# Patient Record
Sex: MALE | Race: WHITE | NOT HISPANIC OR LATINO | URBAN - NONMETROPOLITAN AREA
[De-identification: names, ages, dates, MRNs, and addresses within clinical notes are randomized per-mention and may not be internally consistent; named-entity substitution may affect disease eponyms.]

---

## 2020-03-16 DIAGNOSIS — Z00.00 GENERAL MEDICAL EXAMINATION: Primary | ICD-10-CM

## 2020-03-17 ENCOUNTER — OFFICE VISIT (OUTPATIENT)
Dept: CARDIOLOGY | Facility: CLINIC | Age: 70
End: 2020-03-17

## 2020-03-17 VITALS
BODY MASS INDEX: 30.21 KG/M2 | HEIGHT: 71 IN | DIASTOLIC BLOOD PRESSURE: 82 MMHG | SYSTOLIC BLOOD PRESSURE: 135 MMHG | RESPIRATION RATE: 19 BRPM | WEIGHT: 215.8 LBS | HEART RATE: 69 BPM | OXYGEN SATURATION: 98 %

## 2020-03-17 DIAGNOSIS — I10 BENIGN ESSENTIAL HTN: ICD-10-CM

## 2020-03-17 DIAGNOSIS — Z00.00 GENERAL MEDICAL EXAMINATION: Primary | ICD-10-CM

## 2020-03-17 DIAGNOSIS — E78.2 MIXED HYPERLIPIDEMIA: Primary | ICD-10-CM

## 2020-03-17 PROCEDURE — 93000 ELECTROCARDIOGRAM COMPLETE: CPT | Performed by: INTERNAL MEDICINE

## 2020-03-17 PROCEDURE — 99203 OFFICE O/P NEW LOW 30 MIN: CPT | Performed by: INTERNAL MEDICINE

## 2020-03-17 RX ORDER — ROSUVASTATIN CALCIUM 10 MG/1
10 TABLET, COATED ORAL DAILY
Qty: 30 TABLET | Refills: 11 | Status: SHIPPED | OUTPATIENT
Start: 2020-03-17 | End: 2021-03-15

## 2020-03-17 RX ORDER — LOSARTAN POTASSIUM 50 MG/1
50 TABLET ORAL DAILY
COMMUNITY
Start: 2019-07-25 | End: 2020-09-18

## 2020-03-17 RX ORDER — CHLORAL HYDRATE 500 MG
1 CAPSULE ORAL DAILY
COMMUNITY

## 2020-03-17 RX ORDER — ASPIRIN 81 MG/1
81 TABLET ORAL DAILY
COMMUNITY

## 2020-03-17 RX ORDER — OMEPRAZOLE 40 MG/1
40 CAPSULE, DELAYED RELEASE ORAL DAILY PRN
COMMUNITY
Start: 2020-03-16

## 2020-03-17 RX ORDER — SIMVASTATIN 20 MG
20 TABLET ORAL DAILY
COMMUNITY
Start: 2019-07-25 | End: 2020-03-17

## 2020-03-17 NOTE — PROGRESS NOTES
[facility] Cardiology  OFFICE CONSULT NOTE    Patient Name: Bjorn Quiñonez  Age/Sex: 70 y.o. male  : 1950  MRN: [id number]      Referring Provider: Reyna      Chief Complaint: Cholesterol and hypertension    History of Present Illness:  Bjorn Quiñonez is a 70 y.o. male known to me from previous practice who is here to establish care.  Has a history of cholesterol and hypertension.  His blood pressures under good control.  He is having no chest pain or shortness of air.  He does have a low LDL cholesterol.  He is on simvastatin.  His only complaint is sometimes his feet and hands get cold.  Do not turn colors like when he is in cold weather or like putting his hand in a freezer.   Last Echo:    Last Cath:      Past Medical History:  History reviewed. No pertinent past medical history.    PAST SURGERY   Past Surgical History:   Procedure Laterality Date   • APPENDECTOMY     • LUNG SURGERY      collapsed lung   • ROTATOR CUFF REPAIR Left        Home Medications:      Current Outpatient Medications:   •  aspirin 81 MG EC tablet, Take 81 mg by mouth Daily., Disp: , Rfl:   •  Calcium Polycarbophil (FIBER-CAPS PO), Take 1 mL by mouth Daily., Disp: , Rfl:   •  Coenzyme Q10 (COQ10 PO), Take 1 tablet by mouth Daily., Disp: , Rfl:   •  losartan (COZAAR) 50 MG tablet, Take 50 mg by mouth Daily., Disp: , Rfl:   •  MULTIPLE VITAMINS-MINERALS ER PO, Take 1 tablet by mouth Daily., Disp: , Rfl:   •  Omega-3 Fatty Acids (FISH OIL) 1000 MG capsule capsule, Take 1 capsule by mouth Daily., Disp: , Rfl:   •  omeprazole (priLOSEC) 40 MG capsule, Take 40 mg by mouth Daily As Needed., Disp: , Rfl:   •  Probiotic Product (PROBIOTIC-10 PO), Take 1 tablet by mouth Daily., Disp: , Rfl:   •  rosuvastatin (CRESTOR) 10 MG tablet, Take 1 tablet by mouth Daily., Disp: 30 tablet, Rfl: 11    Allergies:  Allergies   Allergen Reactions   • Benazepril Cough     cough   • Clindamycin Rash   • Codeine Nausea And Vomiting        Social  History:  Social History     Socioeconomic History   • Marital status:      Spouse name: Not on file   • Number of children: Not on file   • Years of education: Not on file   • Highest education level: Not on file   Tobacco Use   • Smoking status: Never Smoker   • Smokeless tobacco: Never Used   Substance and Sexual Activity   • Alcohol use: Never     Frequency: Never   • Drug use: Never   • Sexual activity: Defer        Family History:  Family History   Problem Relation Age of Onset   • Heart attack Father    • Heart disease Sister    • Heart failure Sister    • Heart disease Brother          Review of Systems:   Constitution: No chills, no rigors, no unexplained weight loss or weight gain    Eyes:  No diplopia, no blurred vision, no loss of vision, conjunctiva is pink and sclera is anicteric    ENT:  No tinnitus, no otorrhea, no epistaxis, no sore throat   Respiratory: No cough, no hemoptysis    Cardiovascular:  As mentioned in HPI    Gastrointestinal: No nausea, no vomiting, no hematemesis, no diarrhea or constipation, no melena    Genitourinary: No frequency of dysuria no hematuria    Integument: positive for  No pruritis and  no skin rash    Hematologic / Lymphatic: No excessive bleeding, easy bruising, fatigue, lymphadenopathy and petechiae    Musculoskeletal: No joint pain, joint stiffness, joint swelling, muscle pain, muscle weakness and neck pain    Neurological: No dizziness, headaches, light headedness, seizures and vertigo or stroke    Behavioral/Psych: No depression, or bipolar disorder    Endocrine: no h/o diabetes, hyperlipidemia or thyroid problems        Vitals:    03/17/20 1032   BP: 135/82   Pulse:    Resp:    SpO2:        Body mass index is 30.1 kg/m².          Physical Exam:   General Appearance:    Alert, oriented, cooperative, in no acute distress     Head:    Normocephalic, atraumatic, without obvious abnormality     Eyes:            Lids and lashes normal, conjunctivae and sclerae  normal, no   icterus, no pallor     Ears:    Ears appear intact with no abnormalities noted     Throat:   Mucous membranes pink and moist     Neck:   Supple, trachea midline, no carotid bruit, no JVD     Lungs:     Air enty equal,  Clear to auscultation, respirations regular, Unlabored. No wheezes, rales, rhonchi    Heart:    Regular rhythm and normal rate, normal S1 and S2, no            murmur, no gallop,      Abdomen:     Normal bowel sounds, no masses, liver and spleen nonpalpable, soft, non-tender, non-distended, no guarding, no rebound tenderness       Extremities:   Moves all extremities well, no edema, no cyanosis, no              Redness, no clubbing     Pulses:   Pulses palpable and equal bilaterally       Neurologic:   Alert and oriented to person, place, and time. No focal neurological deficits       Lab Review:     Patient brought his labs with him and they are sore on care everywhere.  His HDL is 31 total cholesterol is 118 LDL is 50.  All his other labs look good.   ]    Assessment/Plan     1. Mixed hyperlipidemia  His LDL is still low.  I am going to try to switch him to Crestor 10 mg daily see if we can have this increase.  We will probably plan to have a  Another lipid panel checked in about 12 to 16 weeks.  2. Benign essential HTN  This is under good control.  3.Cold hands and feet  His pulses are good.  I do not think there is much issue with this clinically.  If he does notice changes with colors then we can always readdress this.      Return in about 6 months (around 9/17/2020).

## 2020-09-18 ENCOUNTER — OFFICE VISIT (OUTPATIENT)
Dept: CARDIOLOGY | Facility: CLINIC | Age: 70
End: 2020-09-18

## 2020-09-18 VITALS
DIASTOLIC BLOOD PRESSURE: 78 MMHG | HEIGHT: 71 IN | BODY MASS INDEX: 29.51 KG/M2 | HEART RATE: 75 BPM | WEIGHT: 210.8 LBS | OXYGEN SATURATION: 99 % | SYSTOLIC BLOOD PRESSURE: 118 MMHG

## 2020-09-18 DIAGNOSIS — E78.2 MIXED HYPERLIPIDEMIA: ICD-10-CM

## 2020-09-18 DIAGNOSIS — I10 BENIGN ESSENTIAL HTN: Primary | ICD-10-CM

## 2020-09-18 PROCEDURE — 99214 OFFICE O/P EST MOD 30 MIN: CPT | Performed by: INTERNAL MEDICINE

## 2020-09-18 RX ORDER — LOSARTAN POTASSIUM 100 MG/1
1 TABLET ORAL DAILY
COMMUNITY
Start: 2020-07-10 | End: 2020-10-26

## 2020-09-18 RX ORDER — HYDROCHLOROTHIAZIDE 25 MG/1
0.5 TABLET ORAL DAILY
COMMUNITY
Start: 2020-07-30

## 2020-09-18 NOTE — PROGRESS NOTES
Bjorn Quiñonez  70 y.o. male    09/18/2020  Chief Complaint   Patient presents with   • Follow-up       History of Present Illness  Ayush is here to follow-up with hypertension    -        SUBJECTIVE  Ayush is overall happy his blood pressure under good control.  He did get put on hydrochlorothiazide at 25 mill grams he noticed that his was very wiped out and dizzy.  He decrease the hydrochlorothiazide to 12.5 mg daily himself and he says he still gets dizziness when he stands up.  He is taking this in addition to losartan.  He is not having chest pain.  He has been diagnosed with CLL but is reportedly stage 0.  He sees Dr. Sanon at [facility].  Allergies   Allergen Reactions   • Benazepril Cough     cough   • Clindamycin Rash   • Codeine Nausea And Vomiting         History reviewed. No pertinent past medical history.      Past Surgical History:   Procedure Laterality Date   • APPENDECTOMY     • LUNG SURGERY      collapsed lung   • ROTATOR CUFF REPAIR Left          Family History   Problem Relation Age of Onset   • Heart attack Father    • Heart disease Sister    • Heart failure Sister    • Heart disease Brother          Social History     Socioeconomic History   • Marital status:      Spouse name: Not on file   • Number of children: Not on file   • Years of education: Not on file   • Highest education level: Not on file   Tobacco Use   • Smoking status: Never Smoker   • Smokeless tobacco: Never Used   Substance and Sexual Activity   • Alcohol use: Never     Frequency: Never   • Drug use: Never   • Sexual activity: Defer         Prior to Admission medications    Medication Sig Start Date End Date Taking? Authorizing Provider   aspirin 81 MG EC tablet Take 81 mg by mouth Daily.   Yes Trent Marshall MD   Calcium Polycarbophil (FIBER-CAPS PO) Take 1 mL by mouth Daily.   Yes Trent Marshall MD   Coenzyme Q10 (COQ10 PO) Take 1 tablet by mouth Daily.   Yes Trent Marshall MD  "  hydroCHLOROthiazide (HYDRODIURIL) 25 MG tablet Take 0.5 tablets by mouth Daily. 7/30/20  Yes Trent Marshall MD   losartan (COZAAR) 100 MG tablet Take 1 tablet by mouth Daily. 7/10/20  Yes Trent Marshall MD   MULTIPLE VITAMINS-MINERALS ER PO Take 1 tablet by mouth Daily.   Yes Trent Marshall MD   Omega-3 Fatty Acids (FISH OIL) 1000 MG capsule capsule Take 1 capsule by mouth Daily.   Yes Trent Marshall MD   omeprazole (priLOSEC) 40 MG capsule Take 40 mg by mouth Daily As Needed. 3/16/20  Yes Trent Marshall MD   Probiotic Product (PROBIOTIC-10 PO) Take 1 tablet by mouth Daily.   Yes Trent Marshall MD   rosuvastatin (CRESTOR) 10 MG tablet Take 1 tablet by mouth Daily. 3/17/20  Yes Liu Antonio MD   losartan (COZAAR) 50 MG tablet Take 50 mg by mouth Daily. 7/25/19 9/18/20  Trent Marshall MD         OBJECTIVE    /78 (BP Location: Right arm, Patient Position: Sitting)   Pulse 75   Ht 180.3 cm (71\")   Wt 95.6 kg (210 lb 12.8 oz)   SpO2 99%   BMI 29.40 kg/m²         Review of Systems     Constitutional:  Denies recent weight loss, weight gain, fever or chills, no change in exercise tolerance     HENT:  Denies any hearing loss, epistaxis, hoarseness, or difficulty speaking.     Eyes: Wears eyeglasses or contact lenses     Respiratory:  Denies dyspnea with exertion,no cough, wheezing, or hemoptysis.     Cardiovascular: Negative for palpations, chest pain, orthopnea, PND, peripheral edema, syncope, or claudication.     Gastrointestinal:  Denies change in bowel habits, dyspepsia, ulcer disease, hematochezia, or melena.     Endocrine: Negative for cold intolerance, heat intolerance, polydipsia, polyphagia and polyuria. Denies any history of weight change, heat/cold intolerance, polydipsia, polyuria     Genitourinary: Negative.      Musculoskeletal: Denies any history of arthritic symptoms or back problems     Skin:  Denies any change in hair or nails, rashes, " or skin lesions.     Allergic/Immunologic: Negative.  Negative for environmental allergies, food allergies and immunocompromised state.     Neurological:  Denies any history of recurrent headaches, strokes, TIA, or seizure disorder.     Hematological: Denies any food allergies, seasonal allergies, bleeding disorders, or lymphadenopathy.     Psychiatric/Behavioral: Denies any history of depression, substance abuse, or change in cognitive function.         Physical Exam     Constitutional: Cooperative, alert and oriented, well-developed, well-nourished, in no acute distress.     HENT:   Head: Normocephalic, normal hair patterns, no masses or tenderness.  Ears, Nose, and Throat: No gross abnormalities. No pallor or cyanosis. Dentition good.   Eyes: EOMS intact, PERRL, conjunctivae and lids unremarkable. Fundoscopic exam and visual fields not performed.   Neck: No palpable masses or adenopathy, no thyromegaly, no JVD, carotid pulses are full and equal bilaterally and without  Bruits.     Cardiovascular: Regular rhythm, S1 and S2 normal, no S3 or S4. Apical impulse not displaced. No murmurs, gallops, or rubs detected.     Pulmonary/Chest: Chest: normal symmetry, no tenderness to palpation, normal respiratory excursion, no intercostal retraction, no use of accessory muscles.            Pulmonary: Normal breath sounds. No rales or ronchi.    Abdominal: Abdomen soft, bowel sounds normoactive, no masses, no hepatosplenomegaly, non-tender, no bruits.     Musculoskeletal: No deformities, clubbing, cyanosis, erythema, or edema observed. There are no spinal abnormalities noted. Normal muscle strength and tone. Pulses full and equal in all extremities, no bruits auscultated.     Neurological: No gross motor or sensory deficits noted, affect appropriate, oriented to time, person, place.     Skin: Warm and dry to the touch, no apparent skin lesions or masses noted.     Psychiatric: He has a normal mood and affect. His behavior is  normal. Judgment and thought content normal.         Procedures      No results found for: WBC, HGB, HCT, MCV, PLT  Lab Results   Component Value Date    BUN 14 07/18/2019    CREATININE 1.0 07/18/2019    BCR 15.0 07/18/2019    CO2 25 07/18/2019    CALCIUM 9.3 07/18/2019    ALBUMIN 4.0 07/18/2019    LABIL2 1.3 07/18/2019    AST 32 07/18/2019    ALT 24 07/23/2020     No results found for: CHOL  Lab Results   Component Value Date    TRIG 222 (H) 07/23/2020    TRIG 184 (H) 07/18/2019    TRIG 184 (H) 07/10/2018     Lab Results   Component Value Date    HDL 28 (L) 07/23/2020    HDL 31 (L) 07/18/2019    HDL 37 (L) 07/10/2018     No components found for: LDLCALC  Lab Results   Component Value Date    LDL 43 07/23/2020    LDL 50 07/18/2019    LDL 59 07/10/2018     No results found for: HDLLDLRATIO  No components found for: CHOLHDL  No results found for: HGBA1C  Lab Results   Component Value Date    TSH 2.840 07/18/2019           ASSESSMENT AND PLAN      Bjorn was seen today for follow-up.    Diagnoses and all orders for this visit:    Benign essential HTN  He is actually having some side effects to his taking the hydrochlorothiazide.  He is to reduce himself to half a tablet daily but still getting dizzy when he stands up.  His losartan by itself was not controlling his blood pressure.  What I have decided to do is to stop the losartan HCTZ and try him on Edarbi 80 mg daily.  We have given him 6 weeks worth of samples.  He is going to let us know how his blood pressure is doing.  Hopefully this will help with his dizziness.  I am not quite sure how much the CLL is contributing though I do not think so at this time.    Mixed hyperlipidemia  This is stable.  CLL  Patient is stage 0 and seen Dr. Sanon.  I have encouraged him to get the flu vaccine as I told him he is no doubt he immunocompromise due to his chronic conditions.  He is agreeable.      Patient's Body mass index is 29.4 kg/m². BMI is within normal parameters. No  follow-up required..                Liu Antonio MD  9/18/2020  12:34 CDT

## 2020-10-27 ENCOUNTER — TELEPHONE (OUTPATIENT)
Dept: CARDIOLOGY | Facility: CLINIC | Age: 70
End: 2020-10-27

## 2021-03-15 RX ORDER — ROSUVASTATIN CALCIUM 10 MG/1
TABLET, COATED ORAL
Qty: 90 TABLET | Refills: 0 | Status: SHIPPED | OUTPATIENT
Start: 2021-03-15 | End: 2021-06-14 | Stop reason: SDUPTHER

## 2021-04-12 PROBLEM — M54.2 NECK PAIN: Status: ACTIVE | Noted: 2017-05-11

## 2021-04-12 PROBLEM — E78.5 HYPERLIPIDEMIA: Status: ACTIVE | Noted: 2021-04-12

## 2021-04-12 PROBLEM — I10 HYPERTENSION: Status: ACTIVE | Noted: 2021-04-12

## 2021-04-12 PROBLEM — C91.10 CLL (CHRONIC LYMPHOCYTIC LEUKEMIA) (HCC): Status: ACTIVE | Noted: 2021-04-12

## 2021-04-12 PROBLEM — N40.0 BPH (BENIGN PROSTATIC HYPERPLASIA): Status: ACTIVE | Noted: 2021-04-12

## 2021-04-20 ENCOUNTER — OFFICE VISIT (OUTPATIENT)
Dept: CARDIOLOGY | Facility: CLINIC | Age: 71
End: 2021-04-20

## 2021-04-20 VITALS
HEIGHT: 71 IN | HEART RATE: 65 BPM | WEIGHT: 200 LBS | SYSTOLIC BLOOD PRESSURE: 122 MMHG | DIASTOLIC BLOOD PRESSURE: 80 MMHG | BODY MASS INDEX: 28 KG/M2 | OXYGEN SATURATION: 99 %

## 2021-04-20 DIAGNOSIS — I10 ESSENTIAL HYPERTENSION: Primary | ICD-10-CM

## 2021-04-20 DIAGNOSIS — E78.2 MIXED HYPERLIPIDEMIA: ICD-10-CM

## 2021-04-20 PROCEDURE — 99213 OFFICE O/P EST LOW 20 MIN: CPT | Performed by: INTERNAL MEDICINE

## 2021-04-20 NOTE — PROGRESS NOTES
Bjorn Quiñonez  71 y.o. male    04/20/2021  Chief Complaint   Patient presents with   • Hypertension   • Hyperlipidemia       History of Present Illness    Patient is seeing us for hypertension hyperlipidemia  -        SUBJECTIVE  Ayush is doing quite well.  He has had shoulder surgery since we last seen him and is recovering from that.  He has CLL and has having no issues at the present time for that.  He is having no angina.  His blood pressure and heart rate has been under good control.  His lipid profile has been checked last summer is excellent.  He said he does get a little dizzy when he goes from stooping to standing up but other than that he is feeling well.  Allergies   Allergen Reactions   • Benazepril Cough     cough   • Clindamycin Rash   • Codeine Nausea And Vomiting         Past Medical History:   Diagnosis Date   • Hyperlipidemia    • Hypertension          Past Surgical History:   Procedure Laterality Date   • APPENDECTOMY     • LUNG SURGERY      collapsed lung   • ROTATOR CUFF REPAIR Left          Family History   Problem Relation Age of Onset   • Heart attack Father    • Heart disease Sister    • Heart failure Sister    • Heart disease Brother          Social History     Socioeconomic History   • Marital status:      Spouse name: Not on file   • Number of children: Not on file   • Years of education: Not on file   • Highest education level: Not on file   Tobacco Use   • Smoking status: Never Smoker   • Smokeless tobacco: Never Used   Vaping Use   • Vaping Use: Never used   Substance and Sexual Activity   • Alcohol use: Never   • Drug use: Never   • Sexual activity: Defer         Prior to Admission medications    Medication Sig Start Date End Date Taking? Authorizing Provider   aspirin 81 MG EC tablet Take 81 mg by mouth Daily.   Yes Provider, MD Trent   azilsartan medoxomil (Edarbi) 80 MG tablet tablet Take 1 tablet by mouth Daily. 10/26/20  Yes Liu Antonio MD   Calcium  "Polycarbophil (FIBER-CAPS PO) Take 1 mL by mouth Daily.   Yes Trent Marshall MD   Coenzyme Q10 (COQ10 PO) Take 1 tablet by mouth Daily.   Yes Trent Marshall MD   MULTIPLE VITAMINS-MINERALS ER PO Take 1 tablet by mouth Daily.   Yes Trent Marshall MD   Omega-3 Fatty Acids (FISH OIL) 1000 MG capsule capsule Take 1 capsule by mouth Daily.   Yes Trent Marshall MD   omeprazole (priLOSEC) 40 MG capsule Take 40 mg by mouth Daily As Needed. 3/16/20  Yes Trent Marshall MD   Probiotic Product (PROBIOTIC-10 PO) Take 1 tablet by mouth Daily.   Yes Trent Marshall MD   rosuvastatin (CRESTOR) 10 MG tablet TAKE ONE TABLET BY MOUTH DAILY 3/15/21  Yes Liu Antonio MD   hydroCHLOROthiazide (HYDRODIURIL) 25 MG tablet Take 0.5 tablets by mouth Daily. 7/30/20   Trent Marshall MD         OBJECTIVE    /80   Pulse 65   Ht 180.3 cm (71\")   Wt 90.7 kg (200 lb)   SpO2 99%   BMI 27.89 kg/m²         Review of Systems     Constitutional:  Denies recent weight loss, weight gain, fever or chills, no change in exercise tolerance     HENT:  Denies any hearing loss, epistaxis, hoarseness, or difficulty speaking.     Eyes: Wears eyeglasses or contact lenses     Respiratory:  Denies dyspnea with exertion,no cough, wheezing, or hemoptysis.     Cardiovascular: Negative for palpations, chest pain, orthopnea, PND, peripheral edema, syncope, or claudication.     Gastrointestinal:  Denies change in bowel habits, dyspepsia, ulcer disease, hematochezia, or melena.     Endocrine: Negative for cold intolerance, heat intolerance, polydipsia, polyphagia and polyuria. Denies any history of weight change, heat/cold intolerance, polydipsia, polyuria     Genitourinary: Negative.      Musculoskeletal: Denies any history of arthritic symptoms or back problems     Skin:  Denies any change in hair or nails, rashes, or skin lesions.     Allergic/Immunologic: Negative.  Negative for environmental allergies, " food allergies and immunocompromised state.     Neurological:  Denies any history of recurrent headaches, strokes, TIA, or seizure disorder.     Hematological: Denies any food allergies, seasonal allergies, bleeding disorders, or lymphadenopathy.     Psychiatric/Behavioral: Denies any history of depression, substance abuse, or change in cognitive function.         Physical Exam     Constitutional: Cooperative, alert and oriented, well-developed, well-nourished, in no acute distress.     HENT:   Head: Normocephalic, normal hair patterns, no masses or tenderness.  Ears, Nose, and Throat: No gross abnormalities. No pallor or cyanosis. Dentition good.   Eyes: EOMS intact, PERRL, conjunctivae and lids unremarkable. Fundoscopic exam and visual fields not performed.   Neck: No palpable masses or adenopathy, no thyromegaly, no JVD, carotid pulses are full and equal bilaterally and without  Bruits.     Cardiovascular: Regular rhythm, S1 and S2 normal, no S3 or S4. Apical impulse not displaced. No murmurs, gallops, or rubs detected.     Pulmonary/Chest: Chest: normal symmetry, no tenderness to palpation, normal respiratory excursion, no intercostal retraction, no use of accessory muscles.            Pulmonary: Normal breath sounds. No rales or ronchi.    Abdominal: Abdomen soft, bowel sounds normoactive, no masses, no hepatosplenomegaly, non-tender, no bruits.     Musculoskeletal: No deformities, clubbing, cyanosis, erythema, or edema observed. There are no spinal abnormalities noted. Normal muscle strength and tone. Pulses full and equal in all extremities, no bruits auscultated.     Neurological: No gross motor or sensory deficits noted, affect appropriate, oriented to time, person, place.     Skin: Warm and dry to the touch, no apparent skin lesions or masses noted.     Psychiatric: He has a normal mood and affect. His behavior is normal. Judgment and thought content normal.         Procedures      Lab Results   Component  Value Date    WBC 24.00 (H) 11/18/2020    HGB 14.0 11/18/2020    HCT 42.8 11/18/2020    MCV 95.7 11/18/2020     11/18/2020     Lab Results   Component Value Date    BUN 16 01/27/2021    CREATININE 1.0 01/27/2021    BCR 17.0 01/27/2021    CO2 27 01/27/2021    CALCIUM 9.2 01/27/2021    ALBUMIN 4.1 01/27/2021    LABIL2 1.4 01/27/2021    AST 40 01/27/2021    ALT 22 01/27/2021     No results found for: CHOL  Lab Results   Component Value Date    TRIG 222 (H) 07/23/2020    TRIG 184 (H) 07/18/2019    TRIG 184 (H) 07/10/2018     Lab Results   Component Value Date    HDL 28 (L) 07/23/2020    HDL 31 (L) 07/18/2019    HDL 37 (L) 07/10/2018     No components found for: LDLCALC  Lab Results   Component Value Date    LDL 43 07/23/2020    LDL 50 07/18/2019    LDL 59 07/10/2018     No results found for: HDLLDLRATIO  No components found for: CHOLHDL  No results found for: HGBA1C  Lab Results   Component Value Date    TSH 3.750 01/27/2021           ASSESSMENT AND PLAN      Diagnoses and all orders for this visit:    1. Essential hypertension (Primary)  -     ECG 12 Lead    2. Mixed hyperlipidemia    Other orders  -     azilsartan medoxomil (Edarbi) 80 MG tablet tablet; Take 1 tablet by mouth Daily.  Dispense: 90 tablet; Refill: 4    Overall the patient is doing quite well.  He needs a refill on his Edarbi which has kept his blood pressure under great control.  We will refill that.  His lipid profile is excellent.  Our plan is to see him in 6 months for repeat follow-up.    Patient's Body mass index is 27.89 kg/m². BMI is within normal parameters. No follow-up required..                Liu Antonio MD  4/20/2021  12:45 CDT

## 2021-06-14 RX ORDER — ROSUVASTATIN CALCIUM 10 MG/1
10 TABLET, COATED ORAL DAILY
Qty: 90 TABLET | Refills: 3 | Status: SHIPPED | OUTPATIENT
Start: 2021-06-14 | End: 2022-06-06

## 2021-10-19 ENCOUNTER — OFFICE VISIT (OUTPATIENT)
Dept: CARDIOLOGY | Facility: CLINIC | Age: 71
End: 2021-10-19

## 2021-10-19 VITALS
WEIGHT: 193.2 LBS | BODY MASS INDEX: 27.05 KG/M2 | SYSTOLIC BLOOD PRESSURE: 118 MMHG | OXYGEN SATURATION: 99 % | HEIGHT: 71 IN | HEART RATE: 62 BPM | DIASTOLIC BLOOD PRESSURE: 80 MMHG

## 2021-10-19 DIAGNOSIS — E78.2 MIXED HYPERLIPIDEMIA: ICD-10-CM

## 2021-10-19 DIAGNOSIS — I10 ESSENTIAL HYPERTENSION: Primary | ICD-10-CM

## 2021-10-19 LAB
QT INTERVAL: 406 MS
QTC INTERVAL: 412 MS

## 2021-10-19 PROCEDURE — 99213 OFFICE O/P EST LOW 20 MIN: CPT | Performed by: INTERNAL MEDICINE

## 2021-10-19 PROCEDURE — 93000 ELECTROCARDIOGRAM COMPLETE: CPT | Performed by: INTERNAL MEDICINE

## 2021-10-19 NOTE — PROGRESS NOTES
Bjorn Quiñonez  71 y.o. male    10/19/2021  Chief Complaint   Patient presents with   • Essential Hypertension     Chief Complaint       History of Present Illness  Patient overall is doing well.  He has hypertension and CLL    -        SUBJECTIVE  Patient overall is doing well.  He is has his blood pressure under good control.  Has noticed chest pain.  He had Covid in May even though he had tattoos vaccines.  Has not antibodies level or check it showed that he had not had any antibodies that have been formed.  This is possibly secondary to his CLL.  Allergies   Allergen Reactions   • Benazepril Cough     cough   • Clindamycin Rash   • Codeine Nausea And Vomiting         Past Medical History:   Diagnosis Date   • Hyperlipidemia    • Hypertension          Past Surgical History:   Procedure Laterality Date   • APPENDECTOMY     • LUNG SURGERY      collapsed lung   • ROTATOR CUFF REPAIR Left          Family History   Problem Relation Age of Onset   • Heart attack Father    • Heart disease Sister    • Heart failure Sister    • Heart disease Brother          Social History     Socioeconomic History   • Marital status:    Tobacco Use   • Smoking status: Never Smoker   • Smokeless tobacco: Never Used   Vaping Use   • Vaping Use: Never used   Substance and Sexual Activity   • Alcohol use: Never   • Drug use: Never   • Sexual activity: Defer         Prior to Admission medications    Medication Sig Start Date End Date Taking? Authorizing Provider   aspirin 81 MG EC tablet Take 81 mg by mouth Daily.   Yes Trent Marshall MD   azilsartan medoxomil (Edarbi) 80 MG tablet tablet Take 1 tablet by mouth Daily. 4/20/21  Yes Liu Antonio MD   Azilsartan Medoxomil 40 MG tablet Take  by mouth.   Yes Trent Marshall MD   Calcium Polycarbophil (FIBER-CAPS PO) Take 1 mL by mouth Daily.   Yes Trent Marshall MD   Coenzyme Q10 (COQ10 PO) Take 1 tablet by mouth Daily.   Yes Trent Marshall MD   MULTIPLE  "VITAMINS-MINERALS ER PO Take 1 tablet by mouth Daily.   Yes Trent Marshall MD   Omega-3 Fatty Acids (FISH OIL) 1000 MG capsule capsule Take 1 capsule by mouth Daily.   Yes Trent Marshall MD   omeprazole (priLOSEC) 40 MG capsule Take 40 mg by mouth Daily As Needed. 3/16/20  Yes Trent Marshall MD   Probiotic Product (PROBIOTIC-10 PO) Take 1 tablet by mouth Daily.   Yes Trent Marshall MD   rosuvastatin (CRESTOR) 10 MG tablet Take 1 tablet by mouth Daily. 6/14/21  Yes Liu Antonio MD   hydroCHLOROthiazide (HYDRODIURIL) 25 MG tablet Take 0.5 tablets by mouth Daily. 7/30/20   Trent Marshall MD         OBJECTIVE    /80 (BP Location: Left arm, Patient Position: Sitting, Cuff Size: Adult)   Pulse 62   Ht 180.3 cm (71\")   Wt 87.6 kg (193 lb 3.2 oz)   SpO2 99%   BMI 26.95 kg/m²         Review of Systems     Constitutional:  Denies recent weight loss, weight gain, fever or chills, no change in exercise tolerance     HENT:  Denies any hearing loss, epistaxis, hoarseness, or difficulty speaking.     Eyes: Wears eyeglasses or contact lenses     Respiratory:  Denies dyspnea with exertion,no cough, wheezing, or hemoptysis.     Cardiovascular: Negative for palpations, chest pain, orthopnea, PND, peripheral edema, syncope, or claudication.     Gastrointestinal:  Denies change in bowel habits, dyspepsia, ulcer disease, hematochezia, or melena.     Endocrine: Negative for cold intolerance, heat intolerance, polydipsia, polyphagia and polyuria. Denies any history of weight change, heat/cold intolerance, polydipsia, polyuria     Genitourinary: Negative.      Musculoskeletal: Denies any history of arthritic symptoms or back problems     Skin:  Denies any change in hair or nails, rashes, or skin lesions.     Allergic/Immunologic: Negative.  Negative for environmental allergies, food allergies and immunocompromised state.     Neurological:  Denies any history of recurrent headaches, " strokes, TIA, or seizure disorder.     Hematological: Denies any food allergies, seasonal allergies, bleeding disorders, or lymphadenopathy.     Psychiatric/Behavioral: Denies any history of depression, substance abuse, or change in cognitive function.         Physical Exam     Constitutional: Cooperative, alert and oriented, well-developed, well-nourished, in no acute distress.     HENT:   Head: Normocephalic, normal hair patterns, no masses or tenderness.  Ears, Nose, and Throat: No gross abnormalities. No pallor or cyanosis. Dentition good.   Eyes: EOMS intact, PERRL, conjunctivae and lids unremarkable. Fundoscopic exam and visual fields not performed.   Neck: No palpable masses or adenopathy, no thyromegaly, no JVD, carotid pulses are full and equal bilaterally and without  Bruits.     Cardiovascular: Regular rhythm, S1 and S2 normal, no S3 or S4. Apical impulse not displaced. No murmurs, gallops, or rubs detected.     Pulmonary/Chest: Chest: normal symmetry, no tenderness to palpation, normal respiratory excursion, no intercostal retraction, no use of accessory muscles.            Pulmonary: Normal breath sounds. No rales or ronchi.    Abdominal: Abdomen soft, bowel sounds normoactive, no masses, no hepatosplenomegaly, non-tender, no bruits.     Musculoskeletal: No deformities, clubbing, cyanosis, erythema, or edema observed. There are no spinal abnormalities noted. Normal muscle strength and tone. Pulses full and equal in all extremities, no bruits auscultated.     Neurological: No gross motor or sensory deficits noted, affect appropriate, oriented to time, person, place.     Skin: Warm and dry to the touch, no apparent skin lesions or masses noted.     Psychiatric: He has a normal mood and affect. His behavior is normal. Judgment and thought content normal.         Procedures      Lab Results   Component Value Date    WBC 24.00 (H) 11/18/2020    HGB 14.0 11/18/2020    HCT 42.8 11/18/2020    MCV 95.7  11/18/2020     11/18/2020     Lab Results   Component Value Date    BUN 16 01/27/2021    CREATININE 1.0 01/27/2021    BCR 17.0 01/27/2021    CO2 27 01/27/2021    CALCIUM 9.2 01/27/2021    ALBUMIN 4.1 01/27/2021    LABIL2 1.4 01/27/2021    AST 40 01/27/2021    ALT 22 01/27/2021     No results found for: CHOL  Lab Results   Component Value Date    TRIG 222 (H) 07/23/2020    TRIG 184 (H) 07/18/2019    TRIG 184 (H) 07/10/2018     Lab Results   Component Value Date    HDL 28 (L) 07/23/2020    HDL 31 (L) 07/18/2019    HDL 37 (L) 07/10/2018     No components found for: LDLCALC  Lab Results   Component Value Date    LDL 43 07/23/2020    LDL 50 07/18/2019    LDL 59 07/10/2018     No results found for: HDLLDLRATIO  No components found for: CHOLHDL  No results found for: HGBA1C  Lab Results   Component Value Date    TSH 3.750 01/27/2021           ASSESSMENT AND PLAN      Diagnoses and all orders for this visit:    1. Essential hypertension (Primary)  -     ECG 12 Lead    2. Mixed hyperlipidemia    Patient's blood pressures under good control on Edarbi.  There will be no need to change this.    He is having his physical with Dr. Jade apodaca [place] and he will get his cholesterol checked at that time.    I have urged him to get the third dose of the vaccine and this I would recommend a changes of full dose MODERNA and do the mixture that in the literature shows that he be up to 76% response.  This is a review that he had no response liquefies for vaccine at first.    Patient's Body mass index is 26.95 kg/m². indicating that he is overweight (BMI 25-29.9). Obesity-related health conditions include the following: hypertension. Obesity is improving with lifestyle modifications. BMI is is above average; BMI management plan is completed. We discussed portion control and increasing exercise..                Liu Antonio MD  10/19/2021  11:43 CDT

## 2022-04-12 ENCOUNTER — OFFICE VISIT (OUTPATIENT)
Dept: CARDIOLOGY | Facility: CLINIC | Age: 72
End: 2022-04-12

## 2022-04-12 VITALS
BODY MASS INDEX: 26.6 KG/M2 | SYSTOLIC BLOOD PRESSURE: 131 MMHG | HEIGHT: 71 IN | DIASTOLIC BLOOD PRESSURE: 65 MMHG | WEIGHT: 190 LBS | HEART RATE: 74 BPM

## 2022-04-12 DIAGNOSIS — E78.2 MIXED HYPERLIPIDEMIA: ICD-10-CM

## 2022-04-12 DIAGNOSIS — I10 ESSENTIAL HYPERTENSION: Primary | ICD-10-CM

## 2022-04-12 PROCEDURE — 99213 OFFICE O/P EST LOW 20 MIN: CPT | Performed by: INTERNAL MEDICINE

## 2022-04-12 NOTE — PROGRESS NOTES
Bjorn Quiñonez  72 y.o. male    04/12/2022  Chief Complaint   Patient presents with   • Essential hypertension       History of Present Illness  Patient with a history of hypertension and chest pain    -  Patient underwent telephone visit as video visit would not connect and he has CLL and does not want to be on the public due to being on treatment      SUBJECTIVE  Patient overall is doing well.  He is having no chest pain or shortness of air.  He is tolerating his blood pressure medicine well which is the Edarbi and he is having no issues.  He is white blood cell count had risen to approximately 300,000 so he was started on Calquence.  He is being monitored for this.  He has no shortness of air.  He is tolerating all his medicines well.  His cholesterol is under good control.  Allergies   Allergen Reactions   • Benazepril Cough     cough   • Clindamycin Rash   • Codeine Nausea And Vomiting         Past Medical History:   Diagnosis Date   • Hyperlipidemia    • Hypertension          Past Surgical History:   Procedure Laterality Date   • APPENDECTOMY     • LUNG SURGERY      collapsed lung   • ROTATOR CUFF REPAIR Left          Family History   Problem Relation Age of Onset   • Heart attack Father    • Heart disease Sister    • Heart failure Sister    • Heart disease Brother          Social History     Socioeconomic History   • Marital status:    Tobacco Use   • Smoking status: Never Smoker   • Smokeless tobacco: Never Used   Vaping Use   • Vaping Use: Never used   Substance and Sexual Activity   • Alcohol use: Never   • Drug use: Never   • Sexual activity: Defer         Prior to Admission medications    Medication Sig Start Date End Date Taking? Authorizing Provider   acalabrutinib (CALQUENCE) 100 MG capsule capsule  3/18/22  Yes ProviderTrent MD   aspirin 81 MG EC tablet Take 81 mg by mouth Daily.   Yes Provider, MD Trent   azilsartan medoxomil (Edarbi) 80 MG tablet tablet Take 1 tablet by mouth  "Daily. 4/20/21  Yes Liu Antonio MD   Azilsartan Medoxomil 40 MG tablet Take  by mouth.   Yes Trent Marshall MD   Calcium Polycarbophil (FIBER-CAPS PO) Take 1 mL by mouth Daily.   Yes Trent Marshall MD   Coenzyme Q10 (COQ10 PO) Take 1 tablet by mouth Daily.   Yes Trent Marshall MD   hydroCHLOROthiazide (HYDRODIURIL) 25 MG tablet Take 0.5 tablets by mouth Daily. 7/30/20  Yes Trent Marshall MD   MULTIPLE VITAMINS-MINERALS ER PO Take 1 tablet by mouth Daily.   Yes Trent Marshall MD   Omega-3 Fatty Acids (FISH OIL) 1000 MG capsule capsule Take 1 capsule by mouth Daily.   Yes Trent Marshall MD   omeprazole (priLOSEC) 40 MG capsule Take 40 mg by mouth Daily As Needed. 3/16/20  Yes Trent Marshall MD   Probiotic Product (PROBIOTIC-10 PO) Take 1 tablet by mouth Daily.   Yes Trent Marshall MD   rosuvastatin (CRESTOR) 10 MG tablet Take 1 tablet by mouth Daily. 6/14/21  Yes Liu Antonio MD         OBJECTIVE    /65   Pulse 74   Ht 180.3 cm (71\")   Wt 86.2 kg (190 lb)   BMI 26.50 kg/m²         Review of Systems     Constitutional:  Denies recent weight loss, weight gain, fever or chills, no change in exercise tolerance     HENT:  Denies any hearing loss, epistaxis, hoarseness, or difficulty speaking.     Eyes: Wears eyeglasses or contact lenses     Respiratory:  Denies dyspnea with exertion,no cough, wheezing, or hemoptysis.     Cardiovascular: Negative for palpations, chest pain, orthopnea, PND, peripheral edema, syncope, or claudication.     Gastrointestinal:  Denies change in bowel habits, dyspepsia, ulcer disease, hematochezia, or melena.     Endocrine: Negative for cold intolerance, heat intolerance, polydipsia, polyphagia and polyuria. Denies any history of weight change, heat/cold intolerance, polydipsia, polyuria     Genitourinary: Negative.      Musculoskeletal: Denies any history of arthritic symptoms or back problems     Skin:  Denies any " change in hair or nails, rashes, or skin lesions.     Allergic/Immunologic: Negative.  Negative for environmental allergies, food allergies and immunocompromised state.     Neurological:  Denies any history of recurrent headaches, strokes, TIA, or seizure disorder.     Hematological: Denies any food allergies, seasonal allergies, bleeding disorders, or lymphadenopathy.     Psychiatric/Behavioral: Denies any history of depression, substance abuse, or change in cognitive function.         Physical Exam     Constitutional: Cooperative, alert and oriented, well-developed, well-nourished, in no acute distress.     HENT:   Head: Normocephalic, normal hair patterns, no masses or tenderness.  Ears, Nose, and Throat: No gross abnormalities. No pallor or cyanosis. Dentition good.   Eyes: EOMS intact, PERRL, conjunctivae and lids unremarkable. Fundoscopic exam and visual fields not performed.   Neck: No palpable masses or adenopathy, no thyromegaly, no JVD, carotid pulses are full and equal bilaterally and without  Bruits.     Cardiovascular: Regular rhythm, S1 and S2 normal, no S3 or S4. Apical impulse not displaced. No murmurs, gallops, or rubs detected.     Pulmonary/Chest: Chest: normal symmetry, no tenderness to palpation, normal respiratory excursion, no intercostal retraction, no use of accessory muscles.            Pulmonary: Normal breath sounds. No rales or ronchi.    Abdominal: Abdomen soft, bowel sounds normoactive, no masses, no hepatosplenomegaly, non-tender, no bruits.     Musculoskeletal: No deformities, clubbing, cyanosis, erythema, or edema observed. There are no spinal abnormalities noted. Normal muscle strength and tone. Pulses full and equal in all extremities, no bruits auscultated.     Neurological: No gross motor or sensory deficits noted, affect appropriate, oriented to time, person, place.     Skin: Warm and dry to the touch, no apparent skin lesions or masses noted.     Psychiatric: He has a normal  mood and affect. His behavior is normal. Judgment and thought content normal.         Procedures      Lab Results   Component Value Date    .45 (H) 03/25/2022    HGB 12.0 (L) 03/25/2022    HCT 37.1 (L) 03/25/2022    MCV 98.7 03/25/2022     03/25/2022     Lab Results   Component Value Date    BUN 21 04/08/2022    CREATININE 1.17 04/08/2022    BCR 17.7 04/08/2022    CO2 25 04/08/2022    CALCIUM 9.4 04/08/2022    ALBUMIN 4.2 04/08/2022    LABIL2 2.3 04/08/2022    AST 64 (H) 04/08/2022    ALT 15 04/08/2022     No results found for: CHOL  Lab Results   Component Value Date    TRIG 222 (H) 07/23/2020    TRIG 184 (H) 07/18/2019    TRIG 184 (H) 07/10/2018     Lab Results   Component Value Date    HDL 28 (L) 07/23/2020    HDL 31 (L) 07/18/2019    HDL 37 (L) 07/10/2018     No components found for: LDLCALC  Lab Results   Component Value Date    LDL 43 07/23/2020    LDL 50 07/18/2019    LDL 59 07/10/2018     No results found for: HDLLDLRATIO  No components found for: CHOLHDL  No results found for: HGBA1C  Lab Results   Component Value Date    TSH 3.750 01/27/2021           ASSESSMENT AND PLAN      Diagnoses and all orders for this visit:    1. Essential hypertension (Primary)    2. Mixed hyperlipidemia    Other orders  -     azilsartan medoxomil (Edarbi) 80 MG tablet tablet; Take 1 tablet by mouth Daily.  Dispense: 90 tablet; Refill: 4    The patient is doing well with no complaints.  He is having no chest pain.  He is tolerating his blood pressure and cholesterol medicine well.  He is now being treated for CLL by Dr. Leland Tompkins at [facility] in [place].  At this time we will continue him on his current medicines with no change.    Patient's Body mass index is 26.5 kg/m². indicating that he is within normal range (BMI 18.5-24.9). No BMI management plan needed..      Total visit is between 11 and 20 minutes.          Liu Antonio MD  4/13/2022  15:21 CDT

## 2022-06-06 RX ORDER — ROSUVASTATIN CALCIUM 10 MG/1
TABLET, COATED ORAL
Qty: 90 TABLET | Refills: 3 | Status: SHIPPED | OUTPATIENT
Start: 2022-06-06